# Patient Record
Sex: MALE | Race: WHITE
[De-identification: names, ages, dates, MRNs, and addresses within clinical notes are randomized per-mention and may not be internally consistent; named-entity substitution may affect disease eponyms.]

---

## 2018-11-19 ENCOUNTER — HOSPITAL ENCOUNTER (EMERGENCY)
Dept: HOSPITAL 25 - UCEAST | Age: 23
Discharge: HOME | End: 2018-11-19
Payer: SELF-PAY

## 2018-11-19 VITALS — SYSTOLIC BLOOD PRESSURE: 148 MMHG | DIASTOLIC BLOOD PRESSURE: 80 MMHG

## 2018-11-19 DIAGNOSIS — F17.210: ICD-10-CM

## 2018-11-19 DIAGNOSIS — Z11.3: Primary | ICD-10-CM

## 2018-11-19 PROCEDURE — 87491 CHLMYD TRACH DNA AMP PROBE: CPT

## 2018-11-19 PROCEDURE — 87591 N.GONORRHOEAE DNA AMP PROB: CPT

## 2018-11-19 PROCEDURE — 99211 OFF/OP EST MAY X REQ PHY/QHP: CPT

## 2018-11-19 PROCEDURE — G0463 HOSPITAL OUTPT CLINIC VISIT: HCPCS

## 2018-11-19 NOTE — UC
Complaint Male HPI





- HPI Summary


HPI Summary: 





21 yo male presents requesting STD testing. He tells me that his partner has 

had blood in her urine with UTI symptoms, but has had multiple negative urine 

cultures. She is in the process of having a further workup, but pt became 

concerned and wanted to rule out that he gave her "something" - as in an STD. 

He last had sexual intercourse with a different partner over a year ago and was 

tested at that time and was negative. He currently has no symptoms.





- History of Current Complaint


Chief Complaint: UCGeneralIllness


Stated Complaint: STD TESTING


Time Seen by Provider: 11/19/18 18:23


Hx Obtained From: Patient


Onset/Duration: Sudden Onset


Severity Currently: None


Pain Intensity: 0





- Allergies/Home Medications


Allergies/Adverse Reactions: 


 Allergies











Allergy/AdvReac Type Severity Reaction Status Date / Time


 


No Known Allergies Allergy   Verified 11/19/18 18:23











Home Medications: 


 Home Medications





NK [No Home Medications Reported]  11/19/18 [History Confirmed 11/19/18]











PMH/Surg Hx/FS Hx/Imm Hx





- Additional Past Medical History


Additional PMH: 





None





- Surgical History


Surgical History: None


Surgery Procedure, Year, and Place: NONE





- Family History


Known Family History: Positive: None





- Social History


Occupation: Student


Lives: Dormitory/Roommates


Alcohol Use: Weekly


Substance Use Type: None


Smoking Status (MU): Current Some Day Smoker


Type: eCigarettes


Amount Used/How Often: 3x per week





Review of Systems


All Other Systems Reviewed And Are Negative: Yes


Constitutional: Positive: Negative


Skin: Positive: Negative


Respiratory: Positive: Negative


Cardiovascular: Positive: Negative


Gastrointestinal: Positive: Negative


Genitourinary: Positive: Negative


Neurological: Positive: Negative


Psychological: Positive: Negative





Physical Exam





- Summary


Physical Exam Summary: 





GENERAL: NAD. WDWN. No pain distress.


NECK: Supple. Nontender. No lymphadenopathy. 


CHEST:  No accessory muscle use. Breathing comfortably and in no distress.


CV:  Pulses intact. Cap refill <2seconds


NEURO: Alert.


PSYCH: Age appropriate behavior.





Triage Information Reviewed: Yes


Vital Signs: 


 Initial Vital Signs











Temp  98.8 F   11/19/18 18:16


 


Pulse  81   11/19/18 18:16


 


Resp  14   11/19/18 18:16


 


BP  148/80   11/19/18 18:16


 


Pulse Ox  100   11/19/18 18:16











Vital Signs Reviewed: Yes





 Complaint Male Course/Dx





- Course


Course Of Treatment: Pt declined  exam as he has no symptoms. He only wishes 

to be tested for GC/C at this time. A urine sample was obtained and will be 

sent to the lab for testing. No treatment at this time as my suspicion that he 

has GC/C is very low. Pt advised to call in a few days to ask about urine 

results.





- Differential Dx/Diagnosis


Provider Diagnoses: STD testing





Discharge





- Sign-Out/Discharge


Documenting (check all that apply): Patient Departure


All imaging exams completed and their final reports reviewed: No Studies





- Discharge Plan


Condition: Stable


Disposition: HOME


Patient Education Materials:  Chlamydia (ED), Safe Sex (ED), Gonorrhea (ED)


Referrals: 


No Primary Care Phys,NOPCP [Primary Care Provider] - 


Additional Instructions: 


If you develop a fever, shortness of breath, chest pain, new or worsening 

symptoms - please call your PCP or go to the ED.


 





Your blood pressure was high at todays visit. Please see your primary provider 

within 4 weeks for recheck and re-evaluation.





Please call in 3-4 days to ask about your lab results





- Billing Disposition and Condition


Condition: STABLE


Disposition: Home

## 2018-11-22 NOTE — ED
Progress





- Progress Note


Progress Note: 





Labs review today: Urine GC/chlamydia is negative


No change in plan.





Course/Dx





- Course


Course Of Treatment: Pt declined  exam as he has no symptoms. He only wishes 

to be tested for GC/C at this time. A urine sample was obtained and will be 

sent to the lab for testing. No treatment at this time as my suspicion that he 

has GC/C is very low. Pt advised to call in a few days to ask about urine 

results.





Discharge





- Sign-Out/Discharge


Documenting (check all that apply): Post-Discharge Follow Up


All imaging exams completed and their final reports reviewed: No Studies





- Discharge Plan


Condition: Stable


Disposition: HOME


Patient Education Materials:  Chlamydia (ED), Safe Sex (ED), Gonorrhea (ED)


Referrals: 


No Primary Care Phys,NOPCP [Primary Care Provider] - 


Additional Instructions: 


If you develop a fever, shortness of breath, chest pain, new or worsening 

symptoms - please call your PCP or go to the ED.


 





Your blood pressure was high at todays visit. Please see your primary provider 

within 4 weeks for recheck and re-evaluation.





Please call in 3-4 days to ask about your lab results





- Billing Disposition and Condition


Condition: STABLE


Disposition: Home